# Patient Record
Sex: FEMALE | Race: WHITE | URBAN - METROPOLITAN AREA
[De-identification: names, ages, dates, MRNs, and addresses within clinical notes are randomized per-mention and may not be internally consistent; named-entity substitution may affect disease eponyms.]

---

## 2019-03-18 ENCOUNTER — TELEPHONE (OUTPATIENT)
Dept: FAMILY MEDICINE CLINIC | Age: 24
End: 2019-03-18

## 2019-03-18 NOTE — TELEPHONE ENCOUNTER
Noted.    And of note, we have no immunization history on file for this patient. Also queried [employer] [employer], and there is no immunization history on file with their system as well. Last office visit  1/5/2016 (greater than 3 years ago).

## 2019-03-18 NOTE — TELEPHONE ENCOUNTER
Patient's father called asking for the patient's blood type and wanting to know if she has had the guardasil vaccine. I informed father that due to the patient being 21years old she would have to call herself. We do not have an authorization on file to speak with the father. His response was \"Well, thank you for allowing me to speak to Dr Celsa Stone" and he ended the call by hanging up.